# Patient Record
Sex: FEMALE | Race: WHITE | HISPANIC OR LATINO | Employment: FULL TIME | URBAN - METROPOLITAN AREA
[De-identification: names, ages, dates, MRNs, and addresses within clinical notes are randomized per-mention and may not be internally consistent; named-entity substitution may affect disease eponyms.]

---

## 2021-03-15 ENCOUNTER — APPOINTMENT (OUTPATIENT)
Dept: RADIOLOGY | Facility: MEDICAL CENTER | Age: 51
End: 2021-03-15
Attending: EMERGENCY MEDICINE
Payer: COMMERCIAL

## 2021-03-15 ENCOUNTER — HOSPITAL ENCOUNTER (EMERGENCY)
Facility: MEDICAL CENTER | Age: 51
End: 2021-03-15
Attending: EMERGENCY MEDICINE
Payer: COMMERCIAL

## 2021-03-15 VITALS
DIASTOLIC BLOOD PRESSURE: 84 MMHG | OXYGEN SATURATION: 99 % | SYSTOLIC BLOOD PRESSURE: 145 MMHG | RESPIRATION RATE: 16 BRPM | HEIGHT: 66 IN | TEMPERATURE: 98.3 F | HEART RATE: 75 BPM | BODY MASS INDEX: 32.1 KG/M2 | WEIGHT: 199.74 LBS
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
## 2021-03-15 DIAGNOSIS — S59.902A INJURY OF LEFT ELBOW, INITIAL ENCOUNTER: ICD-10-CM

## 2021-03-15 DIAGNOSIS — S89.91XA INJURY OF RIGHT KNEE, INITIAL ENCOUNTER: ICD-10-CM

## 2021-03-15 PROCEDURE — 99283 EMERGENCY DEPT VISIT LOW MDM: CPT

## 2021-03-15 PROCEDURE — 73080 X-RAY EXAM OF ELBOW: CPT | Mod: LT

## 2021-03-15 PROCEDURE — 73562 X-RAY EXAM OF KNEE 3: CPT | Mod: RT

## 2021-03-15 RX ORDER — NAPROXEN 250 MG/1
250 TABLET ORAL 2 TIMES DAILY WITH MEALS
COMMUNITY
End: 2021-03-23

## 2021-03-15 NOTE — LETTER
"  FORM C-4:  EMPLOYEE’S CLAIM FOR COMPENSATION/ REPORT OF INITIAL TREATMENT  EMPLOYEE’S CLAIM - PROVIDE ALL INFORMATION REQUESTED   First Name Virginia Last Name Juventino Birthdate 1970  Sex female Claim Number   Home Address [address]   [facility]             Zip [address]                                   Age  51 y.o. 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 (if applicable) HELPING     How did this injury or occupational disease occur? Be specific and answer in detail. Use additional sheet if necessary)  SHE ENTER THE KITCHEN SHE PASS BY 3 MATS AND ONE OF THE MAT SHE STEP ON AND FELL WHEN SHE GOT UP SHE SAW GREASE ON THE MAT BECAUSE SHE TRY TOGET UP AND WAS ALL GREASY AND HAD FOOD. SHE TRY TO PICK HERSELF UP   If you believe that you have an occupational disease, when did you first have knowledge of the disability and it relationship to your employment? NA Witnesses to the Accident  NA   Nature of Injury or Occupational Disease  Inflammation Part(s) of Body Injured or Affected  Knee (R), Elbow (L), N/A    I CERTIFY THAT THE ABOVE IS TRUE AND CORRECT TO THE BEST OF MY KNOWLEDGE AND " THAT I HAVE PROVIDED THIS INFORMATION IN ORDER TO OBTAIN THE BENEFITS OF [place] IN[place] OR [place]).  I HEREBY AUTHORIZE ANY PHYSICIAN, CHIROPRACTOR, SURGEON, PRACTITIONER, OR OTHER PERSON, ANY HOSPITAL, INCLUDING [facility] OR [facility]ITAL, ANY MEDICAL SERVICE ORGANIZATION, ANY INSURANCE COMPANY, OR OTHER INSTITUTION OR ORGANIZATION TO RELEASE TO EACH OTHER, ANY MEDICAL OR OTHER INFORMATION, INCLUDING BENEFITS PAID OR PAYABLE, PERTINENT TO THIS INJURY OR DISEASE, EXCEPT INFORMATION RELATIVE TO DIAGNOSIS, TREATMENT AND/OR COUNSELING FOR AIDS, PSYCHOLOGICAL CONDITIONS, ALCOHOL OR CONTROLLED SUBSTANCES, FOR WHICH I MUST GIVE SPECIFIC AUTHORIZATION.  A PHOTOSTAT OF THIS AUTHORIZATION SHALL BE AS VALID AS THE ORIGINAL.  Date  03/15/2021       [facility]                                        Employee’s Signature   THIS REPORT MUST BE COMPLETED AND MAILED WITHIN 3 WORKING DAYS OF TREATMENT   Place [facility], EMERGENCY DEPT                       Name of Facility [facility]   Date  3/15/2021 Diagnosis  (S89.91XA) Injury of right knee, initial encounter, Acute  (S59.902A) Injury of left elbow, initial encounter, Acute  (W19.XXXA) Accidental fall, initial encounter, Acute Is there evidence the injured employee was under the influence of alcohol and/or another controlled substance at the time of accident?   Hour  9:30 PM Description of Injury or Disease  Injury of right knee, initial encounter  Injury of left elbow, initial encounter  Accidental fall, initial encounter No   Treatment  Supportive care and follow-up with occupational health  Have you advised the patient to remain off work five days or more?         No   X-Ray Findings  Negative If Yes   From Date    To Date      From information given by the employee, together with medical evidence, can you directly  "connect this injury or occupational disease as job incurred? Yes If No, is employee capable of: Full Duty  No Modified Duty  Yes   Is additional medical care by a physician indicated? Yes  Comments:Please follow-up with occupational health If Modified Duty, Specify any Limitations / Restrictions   Limited use of right knee and left elbow   Do you know of any previous injury or disease contributing to this condition or occupational disease? No    Date 3/15/2021 Print Doctor’s Name Ardianne Fitzpatrick certify the employer’s copy of this form was mailed on:   Address [address] [place] [id number] [phone] INSURER’S USE ONLY   Provider’s Tax ID Number [id number] Telephone Dept: [phone]    Doctor’s Signature magdalena-ADRIANNE Sullivan D.O. Degree  M.D.      Form C-4 (rev.10/07)                                                                         BRIEF DESCRIPTION OF RIGHTS AND BENEFITS  (Pursuant to [place] 616C.050)    Notice of Injury or Occupational Disease (Incident Report Form C-1): If an injury or occupational disease (OD) arises out of and in the course of employment, you must provide written notice to your employer as soon as practicable, but no later than 7 days after the accident or OD. Your employer shall maintain a sufficient supply of the required forms.    Claim for Compensation (Form C-4): If medical treatment is sought, the form C-4 is available at the place of initial treatment. A completed \"Claim for Compensation\" (Form C-4) must be filed within 90 days after an accident or OD. The treating physician or chiropractor must, within 3 working days after treatment, complete and mail to the employer, the employer's insurer and third-party , the Claim for Compensation.    Medical Treatment: If you require medical treatment for your on-the-job injury or OD, you may be required to select a physician or chiropractor from a list provided by your workers’ compensation insurer, if it has contracted " with an Organization for Managed Care (MCO) or Preferred Provider Organization (PPO) or providers of health care. If your employer has not entered into a contract with an MCO or PPO, you may select a physician or chiropractor from the Panel of Physicians and Chiropractors. Any medical costs related to your industrial injury or OD will be paid by your insurer.    Temporary Total Disability (TTD): If your doctor has certified that you are unable to work for a period of at least 5 consecutive days, or 5 cumulative days in a 20-day period, or places restrictions on you that your employer does not accommodate, you may be entitled to TTD compensation.    Temporary Partial Disability (TPD): If the wage you receive upon reemployment is less than the compensation for TTD to which you are entitled, the insurer may be required to pay you TPD compensation to make up the difference. TPD can only be paid for a maximum of 24 months.    Permanent Partial Disability (PPD): When your medical condition is stable and there is an indication of a PPD as a result of your injury or OD, within 30 days, your insurer must arrange for an evaluation by a rating physician or chiropractor to determine the degree of your PPD. The amount of your PPD award depends on the date of injury, the results of the PPD evaluation, your age and wage.    Permanent Total Disability (PTD): If you are medically certified by a treating physician or chiropractor as permanently and totally disabled and have been granted a PTD status by your insurer, you are entitled to receive monthly benefits not to exceed 66 2/3% of your average monthly wage. The amount of your PTD payments is subject to reduction if you previously received a lump-sum PPD award.    Vocational Rehabilitation Services: You may be eligible for vocational rehabilitation services if you are unable to return to the job due to a permanent physical impairment or permanent restrictions as a result of your  injury or occupational disease.    Transportation and Per Ashley Reimbursement: You may be eligible for travel expenses and per ashley associated with medical treatment.    Reopening: You may be able to reopen your claim if your condition worsens after claim closure.     Appeal Process: If you disagree with a written determination issued by the insurer or the insurer does not respond to your request, you may appeal to the Department of Administration, , by following the instructions contained in your determination letter. You must appeal the determination within 70 days from the date of the determination letter at [address], [address], [place], [place] [address], or [address], [place], [place] [address]. If you disagree with the  decision, you may appeal to the Department of Administration, . You must file your appeal within 30 days from the date of the  decision letter at [address], [address], [place], [place] [address], or [address] [facility] [address], [place] [address]. If you disagree with a decision of an , you may file a petition for judicial review with the District Court. You must do so within 30 days of the Appeal Officer’s decision. You may be represented by an  at your own expense or you may contact the [facility] for possible representation.    [place]  for Injured Workers (NAIW): If you disagree with a  decision, you may request that NAIW represent you without charge at an  Hearing. For information regarding denial of benefits, you may contact the [facility] at: [address], [address], [place] [address], [phone], or [address] [facility] [address], [place] [address], [phone]    To File a Complaint with the Division: If you wish to file a complaint with the  of the Division of Industrial Relations  (DIR),  please contact the Workers’ Compensation Section, [address], [address], [place], [place] [address], telephone [phone], or [address], [address], [place], [place] [address], telephone [phone].    For assistance with Workers’ Compensation Issues: You may contact the [facility] Office for Consumer Health Assistance, [address], [address], [place], [place] [address], Toll Free 1-199.507.8424, Web site: http://Critical access hospital.[place].gov/Programs/QUANG E-mail: quang@[facility].[place].gov  D-2 (rev. 10/20)              __________________________________________________________________                                   03/15/2021_            Employee Name / Signature                                                                                                                            Date

## 2021-03-16 NOTE — ED PROVIDER NOTES
"ED Provider Note    CHIEF COMPLAINT  Chief Complaint   Patient presents with   • Knee Pain     pt fell yesterday at work, hit her R knee, and L elbow. pt declined hitting her head. Declines LOC.         HPI    Primary care provider: No primary care provider on file.   History obtained from: Patient and daughter  History limited by: None     Elaine Jauregui is a 51 y.o. female who presents to the ED with daughter complaining of right knee and left elbow injury due to a fall at work yesterday.  Patient was walking across 3 mats when she lost her balance and fell onto her right knee and also onto her left elbow with subsequent pain.  She denies any other injuries including head injury or loss of consciousness.  She denies weakness or sensory change.  She is not on any blood thinners.  She has been using naproxen and lidocaine patch with slight improvement of her pain.    REVIEW OF SYSTEMS  Please see HPI for pertinent positives/negatives.  All other systems reviewed and are negative.     PAST MEDICAL HISTORY  No past medical history on file.     SURGICAL HISTORY  History reviewed. No pertinent surgical history.     SOCIAL HISTORY  Social History     Tobacco Use   • Smoking status: Never Smoker   • Smokeless tobacco: Never Used   Substance and Sexual Activity   • Alcohol use: Never   • Drug use: Never   • Sexual activity: Not on file        FAMILY HISTORY  History reviewed. No pertinent family history.     CURRENT MEDICATIONS  Home Medications     Reviewed by Ciera Garcia R.N. (Registered Nurse) on 03/15/21 at 1942  Med List Status: Complete   Medication Last Dose Status   naproxen (NAPROSYN) 250 MG Tab  Active                 ALLERGIES  No Known Allergies     PHYSICAL EXAM  VITAL SIGNS: /84   Pulse 75   Temp 36.8 °C (98.3 °F) (Oral)   Resp 16   Ht 1.676 m (5' 6\")   Wt 90.6 kg (199 lb 11.8 oz)   LMP 03/08/2021   SpO2 99%   BMI 32.24 kg/m²  @CONCEPCIÓN[[id number]::@     Pulse ox interpretation: 98% I " interpret this pulse ox as normal     Constitutional: Well developed, well nourished, alert in no apparent distress, nontoxic appearance   HENT: No external signs of trauma, normocephalic, bilateral external ears normal, mask on due to COVID-19 pandemic, airway patent, nose non TTP with no hematoma/bleeding/drainage, midface stable, no malocclusion, no periorbital swelling/bruising, no mastoid swelling/bruising   Eyes: PERRL, conjunctiva without erythema, no discharge, no icterus   Neck: Soft and supple, trachea midline, no stridor, no swelling/bruising, no midline C-spine tenderness, no stepoffs, good ROM without discomfort or restrictions  Cardiovascular: Regular rate and rhythm, no murmurs/rubs/gallops, strong distal pulses and good perfusion   Thorax & Lungs: No respiratory distress, CTAB with equal BS bilaterally, no chest tenderness  Abdomen: Soft, nontender, nondistended, no G/R, normal BS, pelvis stable   Back: Nontender to palpation  Extremities: No cyanosis, no edema, no gross deformity, good ROM at all joints, diffuse tenderness around the left elbow and right knee, intact distal pulses with brisk cap refill, sensation intact to touch throughout, distal 5/5 strength  Skin: Warm, dry, no pallor/cyanosis, no rash noted   Lymphatic: No lymphadenopathy noted   Neuro: A/O times 3, GCS15, no focal deficits noted, sensation intact to touch, equal strength bilateral UE/LE   Psychiatric: Cooperative, normal mood and affect, normal judgement, appropriate for clinical situation        DIAGNOSTIC STUDIES / PROCEDURES        LABS  All labs reviewed by me.     No results found for this or any previous visit.     RADIOLOGY  The radiologist's interpretation of all radiological studies have been reviewed by me.     DX-ELBOW-COMPLETE 3+ LEFT   Final Result         1.  No acute traumatic bony injury.         DX-KNEE 3 VIEWS RIGHT   Final Result         1.  Small bony fragment along the articular surface of the medial  femoral condyle, likely small osteochondral defect.             COURSE & MEDICAL DECISION MAKING  Nursing notes, VS, PMSFHx reviewed in chart.     Review of past medical records shows the patient without prior visits to this ED.      Differential diagnoses considered include but are not limited to: Fx, dislocation, subluxation, contusion, strain, sprain       History and physical exam as above.  Imaging studies with findings as above.  Patient does not have any point tenderness along the medial femoral condyle and is likely not an acute fracture.  I discussed the findings with the patient and daughter.  This is a pleasant well-appearing patient in no acute distress and nontoxic in appearance.  Discussed with them likely contusion/strain/sprain and outpatient follow-up for reevaluation.  No evidence for significant neurovascular injury or compartment syndrome.  No signs of other significant traumatic injuries.  Return to ED precautions were given.  Patient also noted to have elevated blood pressure for which she can follow-up on outpatient basis for further management.  They verbalized understanding and agreed with plan of care with no further questions or concerns.      The patient is referred to a primary physician for blood pressure management, diabetic screening, and for all other preventative health concerns.       FINAL IMPRESSION  1. Injury of right knee, initial encounter Acute   2. Injury of left elbow, initial encounter Acute   3. Accidental fall, initial encounter Acute          DISPOSITION  Patient will be discharged home in stable condition.       FOLLOW UP  Yesi Rivera  [address] [facility] [address]  [phone]    In 1 day      [facility], Emergency Dept  [address] [id number] [phone]    If symptoms worsen         OUTPATIENT MEDICATIONS  Discharge Medication List as of 3/15/2021  9:36 PM             Electronically signed by: Arturo Fitzpatrick D.O., 3/15/2021 8:36  PM      Portions of this record were made with voice recognition software.  Despite my review, spelling/grammar/context errors may still remain.  Interpretation of this chart should be taken in this context.

## 2021-03-16 NOTE — ED TRIAGE NOTES
"Chief Complaint   Patient presents with   • Knee Pain     pt fell yesterday at work, hit her R knee, and L elbow. pt declined hitting her head. Declines LOC.        Pt to triage for above complaint. Patient states her R knee is hurting, states unable to sleep all night because of the pain, states the pain is worse when she walks. Pt is ambulatory with a steady gait. Pt states her knee feels swollen. Taking naproxen at home for the pain.    Pt is alert and oriented, speaking in full sentences, follows commands and responds appropriately to questions. NAD. Resp are even and unlabored.      Pt placed in lobby. Pt educated on triage process. Pt encouraged to alert staff for any changes.     Patient and staff wearing appropriate PPE    /90   Pulse 75   Temp 36.8 °C (98.3 °F) (Oral)   Resp 18   Ht 1.676 m (5' 6\")   Wt 90.6 kg (199 lb 11.8 oz)   LMP 03/08/2021   SpO2 98%   BMI 32.24 kg/m²   "

## 2021-03-16 NOTE — ED NOTES
Pt fell yesterday and has used naproxen and lidocaine patch to help with pain. Pt ambulated from the waiting room to room 67 with a steady gait.

## 2021-03-23 ENCOUNTER — OCCUPATIONAL MEDICINE (OUTPATIENT)
Dept: OCCUPATIONAL MEDICINE | Facility: CLINIC | Age: 51
End: 2021-03-23
Payer: COMMERCIAL

## 2021-03-23 VITALS
SYSTOLIC BLOOD PRESSURE: 120 MMHG | TEMPERATURE: 96.9 F | HEART RATE: 84 BPM | DIASTOLIC BLOOD PRESSURE: 84 MMHG | BODY MASS INDEX: 32.14 KG/M2 | OXYGEN SATURATION: 96 % | HEIGHT: 66 IN | WEIGHT: 200 LBS

## 2021-03-23 DIAGNOSIS — S80.01XD CONTUSION OF RIGHT KNEE, SUBSEQUENT ENCOUNTER: ICD-10-CM

## 2021-03-23 DIAGNOSIS — S50.02XD CONTUSION OF LEFT ELBOW, SUBSEQUENT ENCOUNTER: ICD-10-CM

## 2021-03-23 PROCEDURE — 99214 OFFICE O/P EST MOD 30 MIN: CPT | Performed by: NURSE PRACTITIONER

## 2021-03-23 RX ORDER — MELOXICAM 7.5 MG/1
15 TABLET ORAL DAILY
Qty: 14 TABLET | Refills: 0 | Status: SHIPPED | OUTPATIENT
Start: 2021-03-23 | End: 2021-03-30

## 2021-03-23 NOTE — PROGRESS NOTES
"Subjective:     Elaine Jauregui is a 51 y.o. female who presents for No chief complaint on file.      DOI 3/14/2021:  Patient was walking across 3 mats when she lost her balance and fell onto her right knee and also onto her left elbow with subsequent pain.  She denies any other injuries including head injury or loss of consciousness.   # [id number]. Imaging taken in ED negative for acute findings.  She had been using lidocaine patches and naproxen with some moderate relief of symptoms, but has not used the Lidocaine since the incident.  States that the naproxen has been ineffective.  She states the pain in her knee and elbow are severe and constant. She states that she has also noticed that when she is sleep at night she has numbness in her knee.  She denies knee instability, does states she has some overall weakness.  She denies numbness, tingling, or overall weakness in the elbow.  Patient states that she constantly walks and that her restrictions are not being followed at work.  She is applied ice 2 or 3 times a week with no improvement.  She has not tried any heat at this time.  Patient would likely benefit from physical therapy, referral placed at this visit.  Plan of care discussed with patient.    ROS: All systems were reviewed on intake form, form was reviewed and signed. See scanned documents in media. Pertinent positives and negatives included in HPI.    PMH: No pertinent past medical history to this problem  MEDS: Medications were reviewed in Epic  ALLERGIES: No Known Allergies  SOCHX: Works as  at [employer]  FH: No pertinent family history to this problem       Objective:     /84   Pulse 84   Temp 36.1 °C (96.9 °F)   Ht 1.676 m (5' 6\")   Wt 90.7 kg (200 lb)   LMP 03/08/2021   SpO2 96%   BMI 32.28 kg/m²     [unfilled]    Left elbow: No obvious deformities noted.  Grossly exaggerated response to very light TTP diffuse tenderness around the left elbow . " Intact distal pulses with brisk cap refill, sensation intact to touch throughout, distal 5/5 strength.  Radial and ulnar pulses 2+ and intact.   strength 5/5.  Negative edema, erythema, open wounds, or severe warmth noted to the joint.   Right Knee: No gross deformity.  Mild patellar joint line tenderness, denies any other tenderness.  FROM. NEG Anterior/posterior drawer test. NEG laxity with varus or valgus stress.  No gait abnormalities noted.  Distal neurovascular strength and sensation intact.  States she has some numbness at night, but was able to feel everything during this office visit.      Assessment/Plan:       1. Contusion of left elbow, subsequent encounter  - meloxicam (MOBIC) 7.5 MG Tab; Take 2 Tablets by mouth every day for 7 days.  Dispense: 14 tablet; Refill: 0  - REFERRAL TO PHYSICAL THERAPY    2. Contusion of right knee, subsequent encounter  - meloxicam (MOBIC) 7.5 MG Tab; Take 2 Tablets by mouth every day for 7 days.  Dispense: 14 tablet; Refill: 0  - REFERRAL TO PHYSICAL THERAPY    Released to Restricted Duty FROM 3/23/2021 TO 4/6/2021  Recommend rotating sitting, standing, and walking every 2 hours with a 10-minute break as needed for comfort  Follow-up in 2 weeks  Restricted duty  Recommend otc topical ointments i.e. Tiger balm, Voltaren gel, or icy hot  Recommend stretching exercises and range of motion exercises provided  Recommend meloxicam as prescribed, if ineffective okay to return   to OTC Tylenol/ibuprofen  Recommend ice/heat application and elevation  Physical therapy referral placed    Differential diagnosis, natural history, supportive care, and indications for immediate follow-up discussed.    Approximately 35 minutes were spent in reviewing notes, preparing for visit, obtaining history, exam and evaluation, patient counseling/education and post visit documentation/orders.

## 2021-03-23 NOTE — LETTER
[address],   [address] [place] [address]  Phone:  [phone] - Fax:  [phone]   Occupational Health [facility] Progress Report and Disability Certification  Date of Service: 3/23/2021   No Show:  No  Date / Time of Next Visit: 4/6/2021 @ 7:30am   Claim Information   Patient Name: Elaine Jauregui  Claim Number:     Employer: [employer]  Date of Injury: 3/14/2021     Insurer / TPA: [employer] [facility]ated within [facility]  ID / SSN:     Occupation:   Diagnosis: Diagnoses of Contusion of left elbow, subsequent encounter and Contusion of right knee, subsequent encounter were pertinent to this visit.    Medical Information   Related to Industrial Injury? Yes    Subjective Complaints:  DOI 3/14/2021:  Patient was walking across 3 mats when she lost her balance and fell onto her right knee and also onto her left elbow with subsequent pain.  She denies any other injuries including head injury or loss of consciousness.   # [id number]. Imaging taken in ED negative for acute findings.  She had been using lidocaine patches and naproxen with some moderate relief of symptoms, but has not used the Lidocaine since the incident.  States that the naproxen has been ineffective.  She states the pain in her knee and elbow are severe and constant. She states that she has also noticed that when she is sleep at night she has numbness in her knee.  She denies knee instability, does states she has some overall weakness.  She denies numbness, tingling, or overall weakness in the elbow.  Patient states that she constantly walks and that her restrictions are not being followed at work.  She is applied ice 2 or 3 times a week with no improvement.  She has not tried any heat at this time.  Patient would likely benefit from physical therapy, referral placed at this visit.  Plan of care discussed with patient.   Objective Findings: Left elbow: No obvious deformities  noted.  Grossly exaggerated response to very light TTP diffuse tenderness around the left elbow . Intact distal pulses with brisk cap refill, sensation intact to touch throughout, distal 5/5 strength.  Radial and ulnar pulses 2+ and intact.   strength 5/5.  Negative edema, erythema, open wounds, or severe warmth noted to the joint.   Right Knee: No gross deformity.  Mild patellar joint line tenderness, denies any other tenderness.  FROM. NEG Anterior/posterior drawer test. NEG laxity with varus or valgus stress.  No gait abnormalities noted.  Distal neurovascular strength and sensation intact.  States she has some numbness at night, but was able to feel everything during this office visit.     Pre-Existing Condition(s):     Assessment:   Condition Same    Status: Additional Care Required  Permanent Disability:No    Plan: MedicationPT    Diagnostics:      Comments:  Follow-up in 2 weeks  Restricted duty  Recommend otc topical ointments i.e. Tiger balm, Voltaren gel, or icy hot  Recommend stretching exercises and range of motion exercises provided  Recommend meloxicam as prescribed, if ineffective okay to return   to OTC Tylenol/ibuprofen  Recommend ice/heat application and elevation  Physical therapy referral placed    Disability Information   Status: Released to Restricted Duty    From:  3/23/2021  Through: 4/6/2021 Restrictions are: Temporary   Physical Restrictions   Sitting:  < or = to 2 hrs/day Standing:  < or = to 2 hrs/day Stooping:    Bending:      Squatting:  < or = to 1 hr/day Walking:  < or = to 2 hrs/day Climbing:    Pushing:      Pulling:    Other:    Reaching Above Shoulder (L):   Reaching Above Shoulder (R):       Reaching Below Shoulder (L):    Reaching Below Shoulder (R):      Not to exceed Weight Limits   Carrying(hrs):   Weight Limit(lb): < or = to 10 pounds Lifting(hrs):   Weight  Limit(lb): < or = to 10 pounds   Comments: Recommend rotating sitting, standing, and walking every 2 hours with a  10-minute break as needed for comfort    Repetitive Actions   Hands: i.e. Fine Manipulations from Grasping:     Feet: i.e. Operating Foot Controls:     Driving / Operate Machinery:     Provider Name:   AGUSTIN Horne Physician Signature:  Physician Name:     Clinic Name / Location: [address],   [address]  [place] [address] Clinic Phone Number: Dept: [phone]   Appointment Time: 11:00 Am Visit Start Time: 11:25 AM   Check-In Time:  11:06 Am Visit Discharge Time:  12:20pm   Original-Treating Physician or Chiropractor    Page 2-Insurer/TPA    Page 3-Employer    Page 4-Employee

## 2021-04-06 ENCOUNTER — OCCUPATIONAL MEDICINE (OUTPATIENT)
Dept: OCCUPATIONAL MEDICINE | Facility: CLINIC | Age: 51
End: 2021-04-06
Payer: COMMERCIAL

## 2021-04-06 VITALS
WEIGHT: 200 LBS | TEMPERATURE: 98.4 F | HEART RATE: 98 BPM | HEIGHT: 66 IN | SYSTOLIC BLOOD PRESSURE: 120 MMHG | OXYGEN SATURATION: 95 % | RESPIRATION RATE: 14 BRPM | DIASTOLIC BLOOD PRESSURE: 80 MMHG | BODY MASS INDEX: 32.14 KG/M2

## 2021-04-06 DIAGNOSIS — S80.01XD CONTUSION OF RIGHT KNEE, SUBSEQUENT ENCOUNTER: ICD-10-CM

## 2021-04-06 DIAGNOSIS — S50.02XD CONTUSION OF LEFT ELBOW, SUBSEQUENT ENCOUNTER: ICD-10-CM

## 2021-04-06 PROCEDURE — 99213 OFFICE O/P EST LOW 20 MIN: CPT | Performed by: NURSE PRACTITIONER

## 2021-04-06 ASSESSMENT — ENCOUNTER SYMPTOMS
CARDIOVASCULAR NEGATIVE: 1
NEUROLOGICAL NEGATIVE: 1
CONSTITUTIONAL NEGATIVE: 1
MYALGIAS: 1
PSYCHIATRIC NEGATIVE: 1
RESPIRATORY NEGATIVE: 1

## 2021-04-06 ASSESSMENT — PAIN SCALES - GENERAL: PAINLEVEL: 5=MODERATE PAIN

## 2021-04-06 NOTE — PROGRESS NOTES
"Subjective:     Elaine Jauregui is a 51 y.o. female who presents for Follow-Up (DOI 3/14/2021 Left Elbow - same - RM 17)      DOI 3/14/2021:  Patient was walking across 3 mats when she lost her balance and fell onto her right knee and also onto her left elbow with subsequent pain.  She denies any other injuries including head injury or loss of consciousness.   # [id number]. Imaging taken in ED negative for acute findings. Today some mild improvement.  The knee pain is intermittent and on both sides of the knee.  She denies knee instability, continued swelling, or popping. She states that her elbow continues to be painful with any movement. She states that the Meloxicam was ineffective, so she returned to taking Naproxen. She has been icing and doing her exercises 1-2 times per day with some mild improvement.  She states that her work restrictions are not being followed she is still doing the same job duties.  Encouraged to speak with manager regarding work restrictions.  Physical therapy referral currently pending.  Plan of care discussed with patient.    Review of Systems   Constitutional: Negative.    Respiratory: Negative.    Cardiovascular: Negative.    Musculoskeletal: Positive for joint pain and myalgias.   Skin: Negative.    Neurological: Negative.    Psychiatric/Behavioral: Negative.        SOCHX: Works as  at [employer]  FH: No pertinent family history to this problem       Objective:     /80   Pulse 98   Temp 36.9 °C (98.4 °F) (Temporal)   Resp 14   Ht 1.676 m (5' 6\")   Wt 90.7 kg (200 lb)   LMP 03/08/2021   SpO2 95%   BMI 32.28 kg/m²     Constitutional: Patient is in no acute distress. Appears well-developed and well-nourished.   Cardiovascular: Normal rate.    Pulmonary/Chest: Effort normal. No respiratory distress.   Neurological: Patient is alert and oriented to person, place, and time.   Skin: Skin is warm and dry.   Psychiatric: Normal mood and " affect. Behavior is normal.     Left elbow: No obvious deformities noted.  Grossly exaggerated response to very light TTP diffuse tenderness around the left elbow . Intact distal pulses with brisk cap refill, sensation intact to touch throughout, distal 5/5 strength.  Radial and ulnar pulses 2+ and intact.   strength 5/5.  Negative edema, erythema, open wounds, or severe warmth noted to the joint.     Right Knee: No gross deformity.  Mild patellar joint line tenderness, denies any other tenderness.  FROM. NEG Anterior/posterior drawer test. NEG laxity with varus or valgus stress.  No gait abnormalities noted.  Distal neurovascular strength and sensation intact.  States she has some numbness at night, but was able to feel everything during this office visit.    Assessment/Plan:       1. Contusion of left elbow, subsequent encounter    2. Contusion of right knee, subsequent encounter    Released to Restricted Duty FROM 4/6/2021 TO 4/20/2021  Recommend rotating sitting, standing, and walking every 2 hours with a 10-minute break as needed for comfort    Follow-up in 2 weeks  Restricted duty  Recommend otc topical ointments i.e. Tiger balm, Voltaren gel, or icy hot  Recommend stretching exercises and range of motion exercises provided  Recommend continue with naproxen as needed   Recommend ice/heat a  pplication and elevation as needed   Physical therapy referral pending    Differential diagnosis, natural history, supportive care, and indications for immediate follow-up discussed.    Approximately 25 minutes was spent in preparing for visit, obtaining history, exam and evaluation, patient counseling/education and post visit documentation/orders.

## 2021-04-06 NOTE — LETTER
[address],   [address] [place] [address]  Phone:  [phone] - Fax:  [phone]   Occupational Health [facility] Progress Report and Disability Certification  Date of Service: 4/6/2021   No Show:  No  Date / Time of Next Visit: 4/20/2021 7:30 AM   Claim Information   Patient Name: Elaine Jauregui  Claim Number:     Employer: [employer]  Date of Injury: 3/14/2021     Insurer / TPA: [employer] [facility]  ID / SSN:     Occupation:   Diagnosis: Diagnoses of Contusion of left elbow, subsequent encounter and Contusion of right knee, subsequent encounter were pertinent to this visit.    Medical Information   Related to Industrial Injury? Yes    Subjective Complaints:  DOI 3/14/2021:  Patient was walking across 3 mats when she lost her balance and fell onto her right knee and also onto her left elbow with subsequent pain.  She denies any other injuries including head injury or loss of consciousness.   # [id number]. Imaging taken in ED negative for acute findings. Today some mild improvement.  The knee pain is intermittent and on both sides of the knee.  She denies knee instability, continued swelling, or popping. She states that her elbow continues to be painful with any movement. She states that the Meloxicam was ineffective, so she returned to taking Naproxen. She has been icing and doing her exercises 1-2 times per day with some mild improvement.  She states that her work restrictions are not being followed she is still doing the same job duties.  Encouraged to speak with manager regarding work restrictions.  Physical therapy referral currently pending.  Plan of care discussed with patient.   Objective Findings: Left elbow: No obvious deformities noted.  Grossly exaggerated response to very light TTP diffuse tenderness around the left elbow . Intact distal pulses with brisk cap refill, sensation intact to touch throughout,  distal 5/5 strength.  Radial and ulnar pulses 2+ and intact.   strength 5/5.  Negative edema, erythema, open wounds, or severe warmth noted to the joint.     Right Knee: No gross deformity.  Mild patellar joint line tenderness, denies any other tenderness.  FROM. NEG Anterior/posterior drawer test. NEG laxity with varus or valgus stress.  No gait abnormalities noted.  Distal neurovascular strength and sensation intact.  States she has some numbness at night, but was able to feel everything during this office visit.   Pre-Existing Condition(s):     Assessment:   Condition Improved    Status: Additional Care Required  Permanent Disability:No    Plan: PT    Diagnostics:      Comments:  Follow-up in 2 weeks  Restricted duty  Recommend otc topical ointments i.e. Tiger balm, Voltaren gel, or icy hot  Recommend stretching exercises and range of motion exercises provided  Recommend continue with naproxen as needed   Recommend ice/heat a  pplication and elevation as needed   Physical therapy referral pending    Disability Information   Status: Released to Restricted Duty    From:  2021  Through: 2021 Restrictions are: Temporary   Physical Restrictions   Sitting:  < or = to 2 hrs/day Standing:  < or = to 2 hrs/day Stooping:    Bending:      Squatting:  < or = to 1 hr/day Walking:  < or = to 2 hrs/day Climbin hrs/day Pushing:      Pulling:    Other:    Reaching Above Shoulder (L):   Reaching Above Shoulder (R):       Reaching Below Shoulder (L):    Reaching Below Shoulder (R):      Not to exceed Weight Limits   Carrying(hrs):   Weight Limit(lb): < or = to 10 pounds Lifting(hrs):   Weight  Limit(lb): < or = to 10 pounds   Comments: Recommend rotating sitting, standing, and walking every 2 hours with a 10-minute break as needed for comfort    Repetitive Actions   Hands: i.e. Fine Manipulations from Grasping:     Feet: i.e. Operating Foot Controls:     Driving / Operate Machinery:     Provider Name:   [place]  AGUSTIN Bhakta Physician Signature:  Physician Name:     Clinic Name / Location: [address],   [address]  [place] [address] Clinic Phone Number: Dept: [phone]   Appointment Time: 7:30 Am Visit Start Time: 7:38 AM   Check-In Time:  7:32 Am Visit Discharge Time:  8:16 AM   Original-Treating Physician or Chiropractor    Page 2-Insurer/TPA    Page 3-Employer    Page 4-Employee

## 2021-04-20 ENCOUNTER — OCCUPATIONAL MEDICINE (OUTPATIENT)
Dept: OCCUPATIONAL MEDICINE | Facility: CLINIC | Age: 51
End: 2021-04-20
Payer: COMMERCIAL

## 2021-04-20 VITALS
BODY MASS INDEX: 32.14 KG/M2 | OXYGEN SATURATION: 98 % | HEART RATE: 68 BPM | WEIGHT: 200 LBS | DIASTOLIC BLOOD PRESSURE: 74 MMHG | TEMPERATURE: 98.6 F | HEIGHT: 66 IN | RESPIRATION RATE: 14 BRPM | SYSTOLIC BLOOD PRESSURE: 110 MMHG

## 2021-04-20 DIAGNOSIS — S80.01XD CONTUSION OF RIGHT KNEE, SUBSEQUENT ENCOUNTER: ICD-10-CM

## 2021-04-20 DIAGNOSIS — S50.02XD CONTUSION OF LEFT ELBOW, SUBSEQUENT ENCOUNTER: ICD-10-CM

## 2021-04-20 PROCEDURE — 99213 OFFICE O/P EST LOW 20 MIN: CPT | Performed by: NURSE PRACTITIONER

## 2021-04-20 ASSESSMENT — ENCOUNTER SYMPTOMS
MUSCULOSKELETAL NEGATIVE: 1
NEUROLOGICAL NEGATIVE: 1
PSYCHIATRIC NEGATIVE: 1
CARDIOVASCULAR NEGATIVE: 1
SENSORY CHANGE: 0
RESPIRATORY NEGATIVE: 1
CONSTITUTIONAL NEGATIVE: 1
MYALGIAS: 0
WEAKNESS: 0

## 2021-04-20 NOTE — LETTER
[address],   [address] [place] [address]  Phone:  [phone] - Fax:  [phone]   Occupational Health [facility] Progress Report and Disability Certification  Date of Service: 4/20/2021   No Show:  No  Date / Time of Next Visit:   Discharged/MMI  Released to Full Duty    Claim Information   Patient Name: Elaine Jauregui  Claim Number:     Employer: [employer]  Date of Injury: 3/14/2021     Insurer / TPA: [employer] [facility]  ID / SSN:     Occupation:   Diagnosis: Diagnoses of Contusion of left elbow, subsequent encounter and Contusion of right knee, subsequent encounter were pertinent to this visit.    Medical Information   Related to Industrial Injury? Yes    Subjective Complaints:  DOI 3/14/2021:  Patient was walking across 3 mats when she lost her balance and fell onto her right knee and also onto her left elbow with subsequent pain.  She denies any other injuries including head injury or loss of consciousness.   # [id number]. Imaging taken in ED negative for acute findings. Today significant overall improvement.  She states she no longer has any pain or elbow pain.  She denies knee instability, continued swelling, or popping. She states that her elbow is free from pain with any movement or use.  She states she has not had to take any medication for more than a week.  She is no longer using the ice or heat.  She has been doing the exercises provided.  She states she never heard from physical therapy, but does not feel she needs to do it because she has been doing exercises at home on her own with significant improvement.  She has been tolerating light duty without difficulty.  She is requesting to be released from care at this time.  Plan of care discussed with patient.     Objective Findings: Left elbow: No obvious deformities noted.  Negative TTP diffuse tenderness around the left elbow . Intact distal pulses  with brisk cap refill, sensation intact to touch throughout, distal 5/5 strength.  Radial and ulnar pulses 2+ and intact.   strength 5/5.  Negative edema, erythema, open wounds, or severe warmth noted to the joint.      Right Knee: No gross deformity.  Negative patellar joint line tenderness, denies any other tenderness.  FROM. NEG Anterior/posterior drawer test. NEG laxity with varus or valgus stress.  No gait abnormalities noted.  Distal neurovascular strength and sensation intact.     Pre-Existing Condition(s):     Assessment:   Condition Improved    Status: Discharged /  MMI  Permanent Disability:No    Plan:      Diagnostics:      Comments:  Discharged/MMI  Released to Full Duty   Follow-up if needed     Disability Information   Status: Released to Full Duty    From:  4/20/2021  Through:   Restrictions are:     Physical Restrictions   Sitting:    Standing:    Stooping:    Bending:      Squatting:    Walking:    Climbing:    Pushing:      Pulling:    Other:    Reaching Above Shoulder (L):   Reaching Above Shoulder (R):       Reaching Below Shoulder (L):    Reaching Below Shoulder (R):      Not to exceed Weight Limits   Carrying(hrs):   Weight Limit(lb):   Lifting(hrs):   Weight  Limit(lb):     Comments:      Repetitive Actions   Hands: i.e. Fine Manipulations from Grasping:     Feet: i.e. Operating Foot Controls:     Driving / Operate Machinery:     Provider Name:   AGUSTIN Horne Physician Signature:  Physician Name:     Clinic Name / Location: [address],   [address]  [place] [address] Clinic Phone Number: Dept: [phone]   Appointment Time: 7:30 Am Visit Start Time: 7:40 AM   Check-In Time:  7:31 Am Visit Discharge Time: 7:58 AM    Original-Treating Physician or Chiropractor    Page 2-Insurer/TPA    Page 3-Employer    Page 4-Employee

## 2021-04-20 NOTE — PROGRESS NOTES
"Subjective:     Elaine Jauregui is a 51 y.o. female who presents for Follow-Up (3/14/2021 Left Elbow  better - RM 16)      DOI 3/14/2021:  Patient was walking across 3 mats when she lost her balance and fell onto her right knee and also onto her left elbow with subsequent pain.  She denies any other injuries including head injury or loss of consciousness.   # [id number]. Imaging taken in ED negative for acute findings. Today significant overall improvement.  She states she no longer has any pain or elbow pain.  She denies knee instability, continued swelling, or popping. She states that her elbow is free from pain with any movement or use.  She states she has not had to take any medication for more than a week.  She is no longer using the ice or heat.  She has been doing the exercises provided.  She states she never heard from physical therapy, but does not feel she needs to do it because she has been doing exercises at home on her own with significant improvement.  She has been tolerating light duty without difficulty.  She is requesting to be released from care at this time.  Plan of care discussed with patient.      Review of Systems   Constitutional: Negative.    Respiratory: Negative.    Cardiovascular: Negative.    Musculoskeletal: Negative.  Negative for joint pain and myalgias.   Skin: Negative.    Neurological: Negative.  Negative for sensory change and weakness.   Psychiatric/Behavioral: Negative.        SOCH[employer]: Works as a  at [employer]  FH: No pertinent family history to this problem.       Objective:     /74   Pulse 68   Temp 37 °C (98.6 °F) (Temporal)   Resp 14   Ht 1.676 m (5' 6\")   Wt 90.7 kg (200 lb)   SpO2 98%   BMI 32.28 kg/m²     Constitutional: Patient is in no acute distress. Appears well-developed and well-nourished.   Cardiovascular: Normal rate.    Pulmonary/Chest: Effort normal. No respiratory distress.   Neurological: Patient is alert and " oriented to person, place, and time.   Skin: Skin is warm and dry.   Psychiatric: Normal mood and affect. Behavior is normal.     Left elbow: No obvious deformities noted.  Negative TTP diffuse tenderness around the left elbow . Intact distal pulses with brisk cap refill, sensation intact to touch throughout, distal 5/5 strength.  Radial and ulnar pulses 2+ and intact.   strength 5/5.  Negative edema, erythema, open wounds, or severe warmth noted to the joint.      Right Knee: No gross deformity.  Negative patellar joint line tenderness, denies any other tenderness.  FROM. NEG Anterior/posterior drawer test. NEG laxity with varus or valgus stress.  No gait abnormalities noted.  Distal neurovascular strength and sensation intact.      Assessment/Plan:       1. Contusion of left elbow, subsequent encounter    2. Contusion of right knee, subsequent encounter    Released to Full Duty FROM 4/20/2021 TO         Discharged/MMI  Released to Full Duty   Follow-up if needed     Differential diagnosis, natural history, supportive care, and indications for immediate follow-up discussed.    Approximately 25 minutes was spent in preparing for visit, obtaining history, exam and evaluation, patient counseling/education and post visit documentation/orders.